# Patient Record
Sex: FEMALE | Race: WHITE | URBAN - METROPOLITAN AREA
[De-identification: names, ages, dates, MRNs, and addresses within clinical notes are randomized per-mention and may not be internally consistent; named-entity substitution may affect disease eponyms.]

---

## 2022-12-29 NOTE — PROGRESS NOTES
Patient instructed to remove shoes and socks and instructed to sit in exam chair. Current PCP is Declan Rx and date of last visit was unknown. Do you have a follow up visit scheduled?   No  If yes, the date is unknown

## 2023-01-04 ENCOUNTER — HOSPITAL ENCOUNTER (OUTPATIENT)
Dept: GENERAL RADIOLOGY | Age: 23
Discharge: HOME | End: 2023-01-06
Payer: COMMERCIAL

## 2023-01-04 ENCOUNTER — OFFICE VISIT (OUTPATIENT)
Dept: PODIATRY | Age: 23
End: 2023-01-04
Payer: COMMERCIAL

## 2023-01-04 ENCOUNTER — HOSPITAL ENCOUNTER (OUTPATIENT)
Age: 23
Discharge: HOME | End: 2023-01-06
Payer: COMMERCIAL

## 2023-01-04 VITALS
BODY MASS INDEX: 34.25 KG/M2 | WEIGHT: 148 LBS | HEART RATE: 62 BPM | DIASTOLIC BLOOD PRESSURE: 64 MMHG | SYSTOLIC BLOOD PRESSURE: 100 MMHG | HEIGHT: 55 IN

## 2023-01-04 DIAGNOSIS — S99.191A CLOSED FRACTURE OF BASE OF FIFTH METATARSAL BONE OF RIGHT FOOT AT METAPHYSEAL-DIAPHYSEAL JUNCTION, INITIAL ENCOUNTER: ICD-10-CM

## 2023-01-04 DIAGNOSIS — S99.191A CLOSED FRACTURE OF BASE OF FIFTH METATARSAL BONE OF RIGHT FOOT AT METAPHYSEAL-DIAPHYSEAL JUNCTION, INITIAL ENCOUNTER: Primary | ICD-10-CM

## 2023-01-04 PROCEDURE — 73610 X-RAY EXAM OF ANKLE: CPT

## 2023-01-04 PROCEDURE — 73630 X-RAY EXAM OF FOOT: CPT

## 2023-01-05 NOTE — PROGRESS NOTES
[address]  [address] [place] [address] [address] [place] [address], [address]  Tel: [phone]   Fax: [phone]    Subjective     CC: Right fifth met fracture    HPI:  Yared Carrillo is a healthy, non-smoker 25y.o. year old female who presents to clinic today for follow-up of a right fifth met fracture which she sustained on [place] about 1 week ago. At that time patient followed up in urgent care and received x-rays. Patient states that x-ray showed a \"Weber fracture\", believed to be closed and nondisplaced. At this time patient was sent to receive foot and ankle x-rays as we are unable to obtain outside facility x-rays. Patient states pain is 4/10 but well controlled with ibuprofen. Also complains of some ankle pain. States the injury was a supination injury. Patient is a  and on her feet a lot but has not worked since the injury. Presents to clinic today WBAT in a cam boot. Denies any systemic symptoms. No other pedal complaints this time. Primary care physician is Declan Smith. ROS:    Constitutional: Denies nausea, vomiting, fever, chills. Neurologic: Denies numbness, tingling, and burning in the feet. Vascular: Denies symptoms of lower extremity claudication. Skin: Denies open wounds. Otherwise negative except as noted in the HPI. PMH:  Past Medical History:   Diagnosis Date    Seasonal allergies        Surgical History:   No past surgical history on file. Social History:  Social History     Tobacco Use    Smoking status: Never   Substance Use Topics    Alcohol use: No    Drug use: No       Medications:  Prior to Admission medications    Medication Sig Start Date End Date Taking? Authorizing Provider   Loratadine (CLARITIN PO) Take  by mouth. Historical Provider, MD   cromolyn (OPTICROM) 4 % ophthalmic solution Place 1 drop into both eyes 4 times daily.  5/17/13   SHERIF Lee - MAKAYLA       Objective     Vitals:    01/04/23 1303   BP: 100/64   Pulse: 62       No results found for: LABA1C    Physical Exam:  General:  Alert and oriented x3. In no acute distress. Lower Extremity Physical Exam:    Vascular: DP and PT pulses are 2+ palpable, Bilateral. CFT <2 seconds to all digits, Bilateral.  Minimal edema, Bilateral.  Hair growth is present to the level of the digits, Bilateral.     Neuro: Saph/sural/SP/DP/plantar sensation intact to light touch. Protective sensation is intact to 10/10 sites as tested with a 5.07g SWMF, Bilateral.     Musculoskeletal: EHL/FHL/GS/TA gross motor intact. Tenderness to palpation of styloid process of right foot. Minimal tenderness on palpation of the peroneus brevis tendon. Also tenderness to palpation of posterior right ankle. No tenderness to palpation high fibula. No tenderness to palpation of the forefoot. 4/5 muscle strength on eversion and plantarflexion, 5/5 muscle strength on inversion and dorsiflexion. Gross deformity is absent, Bilateral.     Dermatologic: No open lesions, Bilateral.  Interdigital maceration absent, Bilateral.  Nails 1-10 are normal.      Biomechanical Exam:    Right foot: 1st MTPJ: Loaded: 65 unloaded: 75  Right foot: 1st Ray: ±5 mm      Right foot: Ankle DF knee extended: 20  Right foot: Ankle DF knee flexed: 25    Left foot: 1st MTPJ: Loaded: 65 unloaded: 75  Left foot: 1st Ray: ±5 mm  Left foot: Ankle DF knee extended: 20  Left foot: Ankle DF knee flexed: 25    Gait Analysis:     Imaging: X-rays obtained: Closed displaced avulsion fracture of the right fifth metatarsal base. Not a true Weber fracture as patient originally believed    Deonna Russell is a 25 y.o. female with     Diagnosis Orders   1.  Closed fracture of base of fifth metatarsal bone of right foot at metaphyseal-diaphyseal junction, initial encounter  XR FOOT RIGHT (MIN 3 VIEWS)    XR ANKLE RIGHT (MIN 3 VIEWS)    XR FOOT RIGHT (MIN 3 VIEWS)           Plan   Patient examined and evaluated  Diagnosis and treatment options discussed in detail  Imaging: X-rays of right foot taken during visit reveal closed nondisplaced fifth metatarsal base/avulsion fracture proximal to the Weber fracture site. Not a true Weber fracture. Patient was educated on nature and etiology of the fracture. All questions were answered to the patient's satisfaction. Surgical intervention is not indicated at this time due to the fracture being nondisplaced. Given the patient's minimal PMH, patient should heal fracture well. Discussed with patient continuation of RICE therapy. At this time patient will remain weightbearing as tolerated in CAM boot and continue taking over-the-counter pain management medication as needed. Patient to RTC in 2 weeks with new x-rays. Patient care discussed with Dr. Fan Burgos  Please, call the office with any questions or concerns     No orders of the defined types were placed in this encounter. Orders Placed This Encounter   Procedures    XR FOOT RIGHT (MIN 3 VIEWS)     Wb please     Standing Status:   Future     Number of Occurrences:   1     Standing Expiration Date:   1/4/2024     Order Specific Question:   Reason for exam:     Answer:   assess for weber fx    XR ANKLE RIGHT (MIN 3 VIEWS)     Wb please     Standing Status:   Future     Number of Occurrences:   1     Standing Expiration Date:   1/4/2024    XR FOOT RIGHT (MIN 3 VIEWS)     Standing Status:   Future     Standing Expiration Date:   1/4/2024     Order Specific Question:   Reason for exam:     Answer:   5th met base fx       Gladys Duenas DPM   Podiatric Medicine & Surgery   1/5/2023 at 5:59 PM      I performed a history and physical examination of the patient and discussed management with the resident. I reviewed the residents note and agree with the documented findings and plan of care. Any areas of disagreement are noted on the chart. I was personally present for the key portions of any procedures.  I have documented in the chart those procedures where I was not present during the key portions. I have reviewed the Podiatry Resident progress note. I agree with the chief complaint, past medical history, past surgical history, allergies, medications, social and family history as documented unless otherwise noted below. Documentation of the HPI, Physical Exam and Medical Decision Making performed by medical students or scribes is based on my personal performance of the HPI, PE and MDM. I have personally evaluated this patient and have completed at least one if not all key elements of the E/M (history, physical exam, and MDM). Additional findings are as noted.      Electronically signed by Jaylin Felix DPM on 1/5/2023

## 2023-03-16 ENCOUNTER — HOSPITAL ENCOUNTER (OUTPATIENT)
Age: 23
Discharge: HOME | End: 2023-03-16

## 2023-04-03 ENCOUNTER — HOSPITAL ENCOUNTER (OUTPATIENT)
Age: 23
Discharge: HOME | End: 2023-04-03

## 2023-04-03 PROCEDURE — 86481 TB AG RESPONSE T-CELL SUSP: CPT

## 2023-04-05 LAB — T-SPOT TB TEST: NORMAL
